# Patient Record
(demographics unavailable — no encounter records)

---

## 2024-10-09 NOTE — HISTORY OF PRESENT ILLNESS
[Home] : at home, [unfilled] , at the time of the visit. [Medical Office: ([facility])___] : at the medical office located in  [Verbal consent obtained from patient] : the patient, [unfilled] [FreeTextEntry1] : The patient-doctor relationship has been established via real time audio communication using telemedicine software.  He has requested care to be assessed and treated through telemedicine. He understands that there may be limitations in this process and that he may need further follow up care in the office and/or hospital setting. I attempted to connect with the patient multiple times via telehealth video platforms, including [employer] and [employer], however he was unable to establish a video connection. He requested that care be provided via audio only.  Very pleasant 60-year-old gentleman who presents for follow-up of microscopic hematuria.  He feels well.  He reports no gross hematuria.  He recently underwent a CT scan which demonstrated no kidney stones, hydronephrosis, nor renal masses.  It demonstrated scattered lung granulomas and an umbilical hernia as well as diverticulosis.  He presents today to discuss these results as well as further management options.  He also reports that he obtained clearance from his cardiologist to start Cialis.  He wishes to do so at this time.

## 2024-10-09 NOTE — ASSESSMENT
[FreeTextEntry1] : Very pleasant 60-year-old gentleman who presents for follow-up of erectile dysfunction, microscopic hematuria -CT images from [facility] radiology reviewed demonstrating no kidney stones, hydronephrosis, renal masses -Continue tamsulosin and finasteride for BPH -Start Cialis 5 mg for erectile dysfunction.  Patient received clearance from his cardiologist to do so -We discussed risks and benefits of Cialis at length, including cardiovascular risks associated with Cialis -Cystoscopy to complete workup for microscopic hematuria  Patient is being seen today for evaluation and management of a chronic and longitudinal ongoing condition and I am of the primary treating physician

## 2024-12-20 NOTE — ASSESSMENT
[FreeTextEntry1] : Very pleasant 60-year-old gentleman who presents for follow-up of BPH, erectile dysfunction, microscopic immaturity, urethral strictures -CT images reviewed demonstrating no kidney stones, hydronephrosis, renal masses -Cystoscopy today demonstrates no urothelial lesions in the bladder but it does demonstrate 2 separate urethral strictures, 1 of which was relatively narrow at the prostatic urethra -Continue tamsulosin -Continue finasteride -Continue tadalafil-refill sent to the pharmacy -We discussed options for management of urethral stricture disease.  He reports he has no difficulty urinating at this time and wishes to observe -Repeat urine studies in 6 months and follow-up at that time

## 2024-12-20 NOTE — HISTORY OF PRESENT ILLNESS
[FreeTextEntry1] : Very pleasant 60-year-old gentleman who presents for follow-up of BPH, urethral strictures, microscopic hematuria, erectile dysfunction.  He reports that tadalafil continues to improve his erections.  He wishes to receive a refill for this medication.  He recently underwent a CT scan which demonstrated no kidney stones, hydronephrosis, nor renal masses.  He underwent a cystoscopy today which demonstrated no urothelial lesions in the bladder, but it did demonstrate 2 urethral strictures, 1 of which was relatively tight near the prostatic urethra.  He continues to take tamsulosin and finasteride.

## 2025-07-11 NOTE — HISTORY OF PRESENT ILLNESS
[Currently Experiencing ___] :  [unfilled] [Weak Stream] : weak stream [None] : None [FreeTextEntry1] : Mr. Hennessy is a very pleasant 61 year old man here today for BPH, urethral strictures and microscopic hematuria. He reports feeling okay since he was here last. Reports that he ran out of tamsulosin and finasteride a few months ago and feels like he has been having some issues with urination. Feels like the stream is weaker and that he has to push to empty. Denies any hematuria or dysuria.

## 2025-07-11 NOTE — ASSESSMENT
[FreeTextEntry1] : Mr. Hennessy is a very pleasant 61 year old man here today for BPH, urethral strictures and microscopic hematuria. He reports feeling okay since he was here last. Reports that he ran out of tamsulosin and finasteride a few months ago and feels like he has been having some issues with urination. Feels like the stream is weaker and that he has to push to empty. Denies any hematuria or dysuria. PSA 05/15/2024 0.08. UC. UA. Restart tamsulosin. Restart finasteride. RTO in 6 months or sooner if needed.  Patient is being seen today for evaluation and management of a chronic and longitudinal ongoing condition and I am the primary treating physician

## 2025-07-23 NOTE — ASSESSMENT
[FreeTextEntry1] : Very pleasant 61 year old man here today for follow-up of BPH, urethral strictures, microscopic hematuria, recent urinary tract infection PSA 05/15/2024 0.08. Urine culture ESBL E. coli; repeat Patient completed a course of fosfomycin Urinalysis demonstrates 0 red blood cells per high-powered field but greater than 1000 mg/dL of glucose; repeat Continue tamsulosin. Continue finasteride. Repeat urine studies. Will follow up with results.  Patient is being seen today for evaluation and management of a chronic and longitudinal ongoing condition and I am the primary treating physician

## 2025-07-23 NOTE — HISTORY OF PRESENT ILLNESS
[Currently Experiencing ___] :  [unfilled] [Weak Stream] : weak stream [None] : None [FreeTextEntry1] : Very pleasant 61 year old man here today for BPH, urethral strictures, and microscopic hematuria.  He recently ran out of tamsulosin and finasteride and reports significant worsening in his urinary symptoms off of the medication.  After restarting the medication he now feels much better.  Approximately 2 weeks ago he was found to have a urinary tract infection.  He completed a course of fosfomycin.  He presents today to recheck urine studies.